# Patient Record
Sex: FEMALE | Race: BLACK OR AFRICAN AMERICAN
[De-identification: names, ages, dates, MRNs, and addresses within clinical notes are randomized per-mention and may not be internally consistent; named-entity substitution may affect disease eponyms.]

---

## 2018-04-29 ENCOUNTER — HOSPITAL ENCOUNTER (EMERGENCY)
Dept: HOSPITAL 76 - ED | Age: 23
Discharge: HOME | End: 2018-04-29
Payer: COMMERCIAL

## 2018-04-29 VITALS — DIASTOLIC BLOOD PRESSURE: 73 MMHG | SYSTOLIC BLOOD PRESSURE: 117 MMHG

## 2018-04-29 DIAGNOSIS — N30.00: Primary | ICD-10-CM

## 2018-04-29 LAB
CLARITY UR REFRACT.AUTO: CLEAR
GLUCOSE UR QL STRIP.AUTO: NEGATIVE MG/DL
HCG UR QL: NEGATIVE
KETONES UR QL STRIP.AUTO: NEGATIVE MG/DL
NITRITE UR QL STRIP.AUTO: NEGATIVE
PH UR STRIP.AUTO: 6.5 PH (ref 5–7.5)
PROT UR STRIP.AUTO-MCNC: NEGATIVE MG/DL
RBC # UR STRIP.AUTO: (no result) /UL
SP GR UR STRIP.AUTO: >=1.03 (ref 1–1.03)
SP GR UR STRIP.AUTO: >=1.03 (ref 1–1.03)
UROBILINOGEN UR QL STRIP.AUTO: (no result) E.U./DL
UROBILINOGEN UR STRIP.AUTO-MCNC: NEGATIVE MG/DL

## 2018-04-29 PROCEDURE — 81025 URINE PREGNANCY TEST: CPT

## 2018-04-29 PROCEDURE — 99283 EMERGENCY DEPT VISIT LOW MDM: CPT

## 2018-04-29 PROCEDURE — 81001 URINALYSIS AUTO W/SCOPE: CPT

## 2018-04-29 PROCEDURE — 81003 URINALYSIS AUTO W/O SCOPE: CPT

## 2018-04-29 PROCEDURE — 87086 URINE CULTURE/COLONY COUNT: CPT

## 2018-04-29 NOTE — ED PHYSICIAN DOCUMENTATION
History of Present Illness





- Stated complaint


Stated Complaint: FEMALE 





- Chief complaint


Chief Complaint: General





- History obtained from


History obtained from: Patient





- History of Present Illness


Timing: How many weeks ago (4)


Pain level max: 3


Pain level now: 1


Improved by: azo yesterday


Worsened by: urination





- Additonal information


Additional information: 





Patient is a 22-year-old female who presents to the emergency department with 

intermittent dysuria for the past 4 weeks.  Has become increasingly worse over 

the past 3 days.  Also having increased frequency and urgency.  No fevers.  

Does have mild low back pain.  No vomiting.  No possibility of pregnancy.  No 

changes in sexual partners.  Has not been sexually active for the past 6 weeks.





Review of Systems


Constitutional: denies: Fever, Chills


Nose: denies: Rhinorrhea / runny nose, Congestion


Throat: denies: Sore throat


Respiratory: denies: Cough


GI: denies: Abdominal Pain, Nausea, Vomiting, Diarrhea


: reports: Dysuria, Frequency, Hesitancy.  denies: Incontinent, Hematuria, 

Discharge


Skin: denies: Rash


Musculoskeletal: denies: Neck pain, Back pain


Neurologic: denies: Headache





PD PAST MEDICAL HISTORY





- Past Medical History


Past Medical History: Yes


: Other (UTI)





- Past Surgical History


Past Surgical History: No





- Present Medications


Home Medications: 


 Ambulatory Orders











 Medication  Instructions  Recorded  Confirmed


 


Nitrofurantoin Monohyd/M-Cryst 100 mg PO BID #10 capsule 04/29/18 





[Macrobid 100 mg Capsule]   














- Allergies


Allergies/Adverse Reactions: 


 Allergies











Allergy/AdvReac Type Severity Reaction Status Date / Time


 


No Known Drug Allergies Allergy   Verified 04/29/18 18:53














- Living Situation


Living Arrangement: reports: At home





- Social History


Does the pt smoke?: No


Does the pt have substance abuse?: No





- Family History


Family history: reports: Non contributory





PD ED PE NORMAL





- Vitals


Vital signs reviewed: Yes





- General


General: Alert and oriented X 3, No acute distress





- HEENT


HEENT: Moist mucous membranes





- Neck


Neck: Supple, no meningeal sign





- Cardiac


Cardiac: RRR





- Respiratory


Respiratory: No respiratory distress, Clear bilaterally





- Abdomen


Abdomen: Soft, Non tender, Non distended





- Female 


Female : Pt declined





- Back


Back: No CVA TTP





- Derm


Derm: Warm and dry, No rash





- Neuro


Neuro: Alert and oriented X 3





Results





- Vitals


Vitals: 


 Vital Signs - 24 hr











  04/29/18





  18:51


 


Temperature 37.4 C


 


Heart Rate 69


 


Respiratory 16





Rate 


 


Blood Pressure 117/73


 


O2 Saturation 100














- Labs


Labs: 


 Laboratory Tests











  04/29/18 04/29/18





  18:56 18:56


 


Urine Color  YELLOW 


 


Urine Clarity  CLEAR 


 


Urine pH  6.5 


 


Ur Specific Gravity  >=1.030 H  >=1.030 H


 


Urine Protein  NEGATIVE 


 


Urine Glucose (UA)  NEGATIVE 


 


Urine Ketones  NEGATIVE 


 


Urine Occult Blood  TRACE-INTA 


 


Urine Nitrite  NEGATIVE 


 


Urine Bilirubin  NEGATIVE 


 


Urine Urobilinogen  0.2 (NORMAL) 


 


Ur Leukocyte Esterase  NEGATIVE 


 


Ur Microscopic Review  NOT INDICATED 


 


Urine Culture Comments  NOT INDICATED 


 


Urine HCG, Qual   NEGATIVE














PD MEDICAL DECISION MAKING





- ED course


Complexity details: reviewed results, re-evaluated patient, considered 

differential, d/w patient


ED course: 





Patient is a 22-year-old female presents to the emergency department with a 

history suggestive of a UTI.  No STD risk factors.  Declines a pelvic 

examination.  Does understand that by treating for a presumed UTI we could miss 

alternative diagnoses.  Patient states that she will follow-up closely with her 

doctor if she fails to improve as expected.  Patient counseled regarding signs 

and symptoms for which I believe and urgent re-evaluation would be necessary. 

Patient with good understanding of and agreement to plan and is comfortable 

going home at this time





This document was made in part using voice recognition software. While efforts 

are made to proofread this document, sound alike and grammatical errors may 

occur.





Departure





- Departure


Disposition: 01 Home, Self Care


Clinical Impression: 


UTI (urinary tract infection)


Qualifiers:


 Urinary tract infection type: acute cystitis Hematuria presence: without 

hematuria Qualified Code(s): N30.00 - Acute cystitis without hematuria





Condition: Good


Instructions:  ED UTI Cystitis Female


Follow-Up: 


your,doctor in 4-5 days if not better [Other]


Prescriptions: 


Nitrofurantoin Monohyd/M-Cryst [Macrobid 100 mg Capsule] 100 mg PO BID #10 

capsule


Comments: 


Take all antibiotics until gone.  Return if you worsen. If you are not 

improving after the antibiotics, you should follow-up with your doctor for a 

pelvic exam for other etiologies of your symptoms.


Discharge Date/Time: 04/29/18 19:37

## 2018-05-07 ENCOUNTER — HOSPITAL ENCOUNTER (EMERGENCY)
Dept: HOSPITAL 76 - ED | Age: 23
Discharge: HOME | End: 2018-05-07
Payer: COMMERCIAL

## 2018-05-07 DIAGNOSIS — S39.012A: Primary | ICD-10-CM
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
## 2018-05-07 LAB
CLARITY UR REFRACT.AUTO: CLEAR
GLUCOSE UR QL STRIP.AUTO: NEGATIVE MG/DL
HCG UR QL: NEGATIVE
KETONES UR QL STRIP.AUTO: (no result) MG/DL
MUCOUS THREADS URNS QL MICRO: (no result)
NITRITE UR QL STRIP.AUTO: NEGATIVE
PH UR STRIP.AUTO: 6.5 PH (ref 5–7.5)
PROT UR STRIP.AUTO-MCNC: NEGATIVE MG/DL
RBC # UR STRIP.AUTO: (no result) /UL
RBC # URNS HPF: (no result) /HPF (ref 0–5)
SP GR UR STRIP.AUTO: 1.02 (ref 1–1.03)
SP GR UR STRIP.AUTO: 1.02 (ref 1–1.03)
SQUAMOUS URNS QL MICRO: (no result)
UROBILINOGEN UR QL STRIP.AUTO: (no result) E.U./DL
UROBILINOGEN UR STRIP.AUTO-MCNC: NEGATIVE MG/DL

## 2018-05-07 PROCEDURE — 99283 EMERGENCY DEPT VISIT LOW MDM: CPT

## 2018-05-07 PROCEDURE — 81025 URINE PREGNANCY TEST: CPT

## 2018-05-07 PROCEDURE — 87086 URINE CULTURE/COLONY COUNT: CPT

## 2018-05-07 PROCEDURE — 81001 URINALYSIS AUTO W/SCOPE: CPT

## 2018-05-07 PROCEDURE — 81003 URINALYSIS AUTO W/O SCOPE: CPT

## 2018-05-07 NOTE — ED PHYSICIAN DOCUMENTATION
PD HPI BACK PAIN





- Stated complaint


Stated Complaint: L SIDE PX





- Chief complaint


Chief Complaint: Abd Pain





- History obtained from


History obtained from: Patient





- History of Present Illness


Timing - onset: Today


Timing - details: Gradual onset, Still present


Location: Lower, Left


Quality: Pain, Spasm


Associated symptoms: No: Fever, Weakness


Worsened by: Movement, Lifting


Similar symptoms before: No diagnosis


Recently seen: Not recently seen





- Additional information


Additional information: 





Patient is a 22 year old female with no significant past medical history who is 

presenting to the emergency department for left sided pain.  patient states 

that she was running with a weighted vest when she developed some pain.  

patient reports that tonight when she tried to go to sleep she couldn't get 

comfortable so she came in for evaluation.  





Review of Systems


Ten Systems: 10 systems reviewed and negative


: denies: Dysuria, Frequency, Discharge, Vaginal bleeding


Musculoskeletal: reports: Back pain





PD PAST MEDICAL HISTORY





- Past Medical History


Past Medical History: Yes


: Other





- Past Surgical History


Past Surgical History: No





- Present Medications


Home Medications: 


 Ambulatory Orders











 Medication  Instructions  Recorded  Confirmed


 


Nitrofurantoin Monohyd/M-Cryst 100 mg PO BID #10 capsule 04/29/18 





[Macrobid 100 mg Capsule]   


 


Cyclobenzaprine [Flexeril] 10 mg PO TID PRN #10 tablet 05/07/18 


 


Lidocaine Patch 5% [Lidoderm Patch] 1 each TOP DAILY #10 patch 05/07/18 














- Allergies


Allergies/Adverse Reactions: 


 Allergies











Allergy/AdvReac Type Severity Reaction Status Date / Time


 


No Known Drug Allergies Allergy   Verified 04/29/18 18:53














- Social History


Does the pt smoke?: No


Smoking Status: Never smoker


Does the pt have substance abuse?: No





- POLST


Patient has POLST: No





PD ED PE NORMAL





- Vitals


Vital signs reviewed: Yes





- General


General: Alert and oriented X 3, No acute distress





- HEENT


HEENT: Atraumatic, PERRL





- Cardiac


Cardiac: RRR





- Respiratory


Respiratory: No respiratory distress





- Abdomen


Abdomen: Soft, Non tender, Non distended





- Derm


Derm: Normal color





- Extremities


Extremities: No deformity





- Neuro


Neuro: Alert and oriented X 3, No motor deficit


Eye Opening: Spontaneous


Motor: Obeys Commands


Verbal: Oriented


GCS Score: 15





- Psych


Psych: Normal mood





PD ED PE EXPANDED





- Back


Back: Soft tissue tenderness (mild tenderness to palaption of left lateral back)





Results





- Vitals


Vitals: 





 Vital Signs - 24 hr











  05/07/18 05/07/18





  21:42 23:08


 


Temperature 36.8 C 


 


Heart Rate 75 69


 


Respiratory 18 16





Rate  


 


Blood Pressure 100/61 109/80


 


O2 Saturation 100 99








 Oxygen











O2 Source                      Room air

















- Labs


Labs: 





 Laboratory Tests











  05/07/18 05/07/18





  22:15 22:15


 


Urine Color  YELLOW 


 


Urine Clarity  CLEAR 


 


Urine pH  6.5 


 


Ur Specific Gravity  1.025  1.025


 


Urine Protein  NEGATIVE 


 


Urine Glucose (UA)  NEGATIVE 


 


Urine Ketones  TRACE 


 


Urine Occult Blood  SMALL H 


 


Urine Nitrite  NEGATIVE 


 


Urine Bilirubin  NEGATIVE 


 


Urine Urobilinogen  0.2 (NORMAL) 


 


Ur Leukocyte Esterase  NEGATIVE 


 


Urine RBC  6-10 H 


 


Urine WBC  0-3 


 


Ur Squamous Epith Cells  MOD Squamous H 


 


Urine Bacteria  Few 


 


Urine Mucus  Few Strands 


 


Ur Microscopic Review  INDICATED 


 


Urine Culture Comments  NOT INDICATED 


 


Urine HCG, Qual   NEGATIVE














PD MEDICAL DECISION MAKING





- ED course


Complexity details: reviewed old records, reviewed results, re-evaluated patient

, considered differential, d/w patient


ED course: 





patient was seen and examined at bedside.  Urine was collected.  patient showed 

no sign of acute infection.  Patient was treated with ibuprofen and lidoderm 

patch.  Patient required no further work up and was stable for discharge with 

outpatient follow up.  





Departure





- Departure


Disposition: 01 Home, Self Care


Clinical Impression: 


 Low back strain





Condition: Good


Instructions:  ED Spasm Back No Trauma


Follow-Up: 


primary,care provider [Other] - Within 3 Days


Prescriptions: 


Cyclobenzaprine [Flexeril] 10 mg PO TID PRN #10 tablet


 PRN Reason: Spasms


Lidocaine Patch 5% [Lidoderm Patch] 1 each TOP DAILY #10 patch


Comments: 


Your diagnostics today were within normal limits.  Your symptoms are likely 

secondary to a muscle strain.  You should alternate between motrin and tylenol 

as needed for pain. You should also apply ice and heat as well as the lidoderm 

patch. You can take the flexeril for spasm but you cannot drive, work or 

operate heavy machinery while taking it.  You should follow up with your doctor 

if your symptoms persist.  You may return to the emergency department at any 

time for new, worsening or uncontrollable symptoms.  


Forms:  Activity restrictions

## 2018-05-08 VITALS — DIASTOLIC BLOOD PRESSURE: 68 MMHG | SYSTOLIC BLOOD PRESSURE: 110 MMHG

## 2018-06-02 ENCOUNTER — HOSPITAL ENCOUNTER (EMERGENCY)
Dept: HOSPITAL 76 - ED | Age: 23
Discharge: HOME | End: 2018-06-02
Payer: COMMERCIAL

## 2018-06-02 VITALS — SYSTOLIC BLOOD PRESSURE: 110 MMHG | DIASTOLIC BLOOD PRESSURE: 72 MMHG

## 2018-06-02 DIAGNOSIS — Z20.2: Primary | ICD-10-CM

## 2018-06-02 LAB
CLARITY UR REFRACT.AUTO: CLEAR
GLUCOSE UR QL STRIP.AUTO: NEGATIVE MG/DL
HCG UR QL: NEGATIVE
KETONES UR QL STRIP.AUTO: NEGATIVE MG/DL
NITRITE UR QL STRIP.AUTO: NEGATIVE
PH UR STRIP.AUTO: 7 PH (ref 5–7.5)
PROT UR STRIP.AUTO-MCNC: NEGATIVE MG/DL
RBC # UR STRIP.AUTO: NEGATIVE /UL
SP GR UR STRIP.AUTO: 1.01 (ref 1–1.03)
UROBILINOGEN UR QL STRIP.AUTO: (no result) E.U./DL
UROBILINOGEN UR STRIP.AUTO-MCNC: NEGATIVE MG/DL

## 2018-06-02 PROCEDURE — 81025 URINE PREGNANCY TEST: CPT

## 2018-06-02 PROCEDURE — 99283 EMERGENCY DEPT VISIT LOW MDM: CPT

## 2018-06-02 PROCEDURE — 86695 HERPES SIMPLEX TYPE 1 TEST: CPT

## 2018-06-02 PROCEDURE — 86696 HERPES SIMPLEX TYPE 2 TEST: CPT

## 2018-06-02 PROCEDURE — 87491 CHLMYD TRACH DNA AMP PROBE: CPT

## 2018-06-02 PROCEDURE — 87210 SMEAR WET MOUNT SALINE/INK: CPT

## 2018-06-02 PROCEDURE — 87591 N.GONORRHOEAE DNA AMP PROB: CPT

## 2018-06-02 PROCEDURE — 87086 URINE CULTURE/COLONY COUNT: CPT

## 2018-06-02 PROCEDURE — 81001 URINALYSIS AUTO W/SCOPE: CPT

## 2018-06-02 PROCEDURE — 87529 HSV DNA AMP PROBE: CPT

## 2018-06-02 PROCEDURE — 99282 EMERGENCY DEPT VISIT SF MDM: CPT

## 2018-06-02 PROCEDURE — 81599 UNLISTED MAAA: CPT

## 2018-06-02 PROCEDURE — 81003 URINALYSIS AUTO W/O SCOPE: CPT

## 2018-06-02 NOTE — ED PHYSICIAN DOCUMENTATION
History of Present Illness





- Stated complaint


Stated Complaint: FEMALE 





- Chief complaint


Chief Complaint: UTI





- Additonal information


Additional information: 





hx from pt


22 f


found out her SO has herpes


wants to be tested











Review of Systems


Constitutional: denies: Fever


: reports: Dysuria.  denies: Now pregnant EGA


Skin: denies: Lesions





PD PAST MEDICAL HISTORY





- Past Medical History


Past Medical History: No


: Other





- Past Surgical History


Past Surgical History: No





- Present Medications


Home Medications: 


 Ambulatory Orders











 Medication  Instructions  Recorded  Confirmed


 


No Known Home Medications [No  06/02/18 06/02/18





Known Home Medications]   














- Allergies


Allergies/Adverse Reactions: 


 Allergies











Allergy/AdvReac Type Severity Reaction Status Date / Time


 


No Known Drug Allergies Allergy   Verified 06/02/18 09:37














- Social History


Does the pt smoke?: No


Smoking Status: Never smoker


Does the pt drink ETOH?: No


Does the pt have substance abuse?: No





- Immunizations


Immunizations are current?: Yes





- POLST


Patient has POLST: No





PD ED PE NORMAL





- Vitals


Vital signs reviewed: Yes





- General


General: Alert and oriented X 3





- Cardiac


Cardiac: RRR





- Respiratory


Respiratory: No respiratory distress, Clear bilaterally





- Abdomen


Abdomen: Non tender





- Female 


Female : Chaperone present ([employer]), Other (no herpes lesions, no dc, no CMT, 

cultures taken for all)





Results





- Vitals


Vitals: 





 Vital Signs - 24 hr











  06/02/18





  09:33


 


Temperature 37.2 C


 


Heart Rate 79


 


Respiratory 18





Rate 


 


Blood Pressure 119/55 L


 


O2 Saturation 100








 Oxygen











O2 Source                      Room air

















- Labs


Labs: 





 Laboratory Tests











  06/02/18





  09:40


 


Urine Color  YELLOW


 


Urine Clarity  CLEAR


 


Urine pH  7.0


 


Ur Specific Gravity  1.015


 


Urine Protein  NEGATIVE


 


Urine Glucose (UA)  NEGATIVE


 


Urine Ketones  NEGATIVE


 


Urine Occult Blood  NEGATIVE


 


Urine Nitrite  NEGATIVE


 


Urine Bilirubin  NEGATIVE


 


Urine Urobilinogen  0.2 (NORMAL)


 


Ur Leukocyte Esterase  NEGATIVE


 


Ur Microscopic Review  NOT INDICATED


 


Urine Culture Comments  NOT INDICATED


 


Urine HCG, Qual  NEGATIVE














Departure





- Departure


Disposition: 01 Home, Self Care


Clinical Impression: 


 Exposure to STD





Condition: Good


Instructions:  STD Poss


Comments: 


Your exam was normal - no sign of herpes.


We took swabs to est for bacterial vaginosis, trichomonas, gonorrhea, chlamydia 

as well as herpes


The ER staff will call you if any of the results are positive.


You can also have [facility] call our lab for results.


Given the normal exam I would not recommend any treatment unless the tests come 

back positive


If you think you might need testing for HIV, that would best be done [place] 

[place]

## 2018-06-17 ENCOUNTER — HOSPITAL ENCOUNTER (EMERGENCY)
Dept: HOSPITAL 76 - ED | Age: 23
Discharge: HOME | End: 2018-06-17
Payer: COMMERCIAL

## 2018-06-17 VITALS — DIASTOLIC BLOOD PRESSURE: 67 MMHG | SYSTOLIC BLOOD PRESSURE: 119 MMHG

## 2018-06-17 DIAGNOSIS — B96.89: ICD-10-CM

## 2018-06-17 DIAGNOSIS — N76.0: Primary | ICD-10-CM

## 2018-06-17 LAB
CLARITY UR REFRACT.AUTO: CLEAR
GLUCOSE UR QL STRIP.AUTO: NEGATIVE MG/DL
HCG UR QL: NEGATIVE
KETONES UR QL STRIP.AUTO: NEGATIVE MG/DL
NITRITE UR QL STRIP.AUTO: NEGATIVE
PH UR STRIP.AUTO: 7 PH
PROT UR STRIP.AUTO-MCNC: NEGATIVE MG/DL
RBC # UR STRIP.AUTO: (no result) /UL
SP GR UR STRIP.AUTO: 1.02
UROBILINOGEN UR QL STRIP.AUTO: (no result) E.U./DL
UROBILINOGEN UR STRIP.AUTO-MCNC: NEGATIVE MG/DL

## 2018-06-17 PROCEDURE — 99283 EMERGENCY DEPT VISIT LOW MDM: CPT

## 2018-06-17 PROCEDURE — 87086 URINE CULTURE/COLONY COUNT: CPT

## 2018-06-17 PROCEDURE — 87220 TISSUE EXAM FOR FUNGI: CPT

## 2018-06-17 PROCEDURE — 81025 URINE PREGNANCY TEST: CPT

## 2018-06-17 PROCEDURE — 81003 URINALYSIS AUTO W/O SCOPE: CPT

## 2018-06-17 PROCEDURE — 87210 SMEAR WET MOUNT SALINE/INK: CPT

## 2018-06-17 PROCEDURE — 81001 URINALYSIS AUTO W/SCOPE: CPT

## 2018-06-17 PROCEDURE — 87591 N.GONORRHOEAE DNA AMP PROB: CPT

## 2018-06-17 PROCEDURE — 87491 CHLMYD TRACH DNA AMP PROBE: CPT

## 2018-06-17 NOTE — ED PHYSICIAN DOCUMENTATION
History of Present Illness





- Stated complaint


Stated Complaint: FEMALE 





- Chief complaint


Chief Complaint: UTI





- History obtained from


History obtained from: Patient





- History of Present Illness


Timing: How many weeks ago (1)


Pain level max: 3


Pain level now: 3


Improved by: nothing


Worsened by: nothing





- Additonal information


Additional information: 





Patient is a 22-year-old female who presents to the emergency department 

complaining of vaginal discharge, itching and discomfort.  Was recently treated 

with azithromycin for chlamydia.  Tested negative for gonorrhea.  She is 

concerned that she may have a yeast infection or bacterial vaginitis.  No 

fevers.  No abdominal pain.  No diarrhea or constipation.  No vomiting.  Denies 

any possibility of pregnancy.





Review of Systems


Constitutional: denies: Fever, Chills


Ears: denies: Ear pain


Nose: denies: Rhinorrhea / runny nose, Congestion


Respiratory: denies: Cough


GI: denies: Abdominal Pain, Nausea, Vomiting, Diarrhea


: reports: Discharge.  denies: Vaginal bleeding


Skin: denies: Rash


Musculoskeletal: denies: Neck pain, Back pain


Neurologic: denies: Focal weakness, Numbness, Headache





PD PAST MEDICAL HISTORY





- Past Medical History


Past Medical History: No


: Other





- Past Surgical History


Past Surgical History: No





- Present Medications


Home Medications: 


 Ambulatory Orders











 Medication  Instructions  Recorded  Confirmed


 


Metronidazole [Flagyl] 500 mg PO BID #14 tablet 06/17/18 














- Allergies


Allergies/Adverse Reactions: 


 Allergies











Allergy/AdvReac Type Severity Reaction Status Date / Time


 


No Known Drug Allergies Allergy   Verified 06/17/18 17:22














- Living Situation


Living Arrangement: reports: At home





- Social History


Does the pt smoke?: No


Smoking Status: Never smoker


Does the pt drink ETOH?: No


Does the pt have substance abuse?: No





- Family History


Family history: reports: Non contributory





- Immunizations


Immunizations are current?: Yes





- POLST


Patient has POLST: No





PD ED PE NORMAL





- Vitals


Vital signs reviewed: Yes





- General


General: Alert and oriented X 3





- HEENT


HEENT: Moist mucous membranes





- Neck


Neck: Supple, no meningeal sign





- Cardiac


Cardiac: RRR





- Respiratory


Respiratory: No respiratory distress, Clear bilaterally





- Abdomen


Abdomen: Soft, Non tender, Non distended





- Female 


Female : Chaperone present ([facility]), Other (diffuse vaginal 

irritation with thin white discharge. no CMT, no adnexal tenderness.)





- Back


Back: No CVA TTP, No spinal TTP





- Derm


Derm: Warm and dry





- Neuro


Neuro: Alert and oriented X 3





- Psych


Psych: Normal mood, Normal affect





Results





- Vitals


Vitals: 


 Vital Signs - 24 hr











  06/17/18





  16:31


 


Temperature 37.4 C


 


Heart Rate 75


 


Respiratory 16





Rate 


 


Blood Pressure 119/67


 


O2 Saturation 100








 Oxygen











O2 Source                      Room air

















- Labs


Labs: 


 Microbiology











 06/17/18 17:09 KOH Preparation - Final





 Other - Vaginal 


 


 06/17/18 17:09 Wet Prep - Final





 Vaginal 








 Laboratory Tests











  06/17/18





  16:45


 


Urine Color  YELLOW


 


Urine Clarity  CLEAR


 


Urine pH  7.0


 


Ur Specific Gravity  1.020


 


Urine Protein  NEGATIVE


 


Urine Glucose (UA)  NEGATIVE


 


Urine Ketones  NEGATIVE


 


Urine Occult Blood  TRACE-INTA


 


Urine Nitrite  NEGATIVE


 


Urine Bilirubin  NEGATIVE


 


Urine Urobilinogen  0.2 (NORMAL)


 


Ur Leukocyte Esterase  NEGATIVE


 


Ur Microscopic Review  NOT INDICATED


 


Urine Culture Comments  NOT INDICATED


 


Urine HCG, Qual  NEGATIVE














PD MEDICAL DECISION MAKING





- ED course


Complexity details: reviewed results, re-evaluated patient, considered 

differential, d/w patient


ED course: 





Patient is a 22-year-old female who presents to the emergency department with 

vaginal discharge and pain.  Recently treated for chlamydia.  Will resend her 

Chlamydia testing to ensure the infection is cleared.  Appears to have 

bacterial vaginitis and will place on Flagyl for this.  Patient counseled 

regarding signs and symptoms for which I believe and urgent re-evaluation would 

be necessary. Patient with good understanding of and agreement to plan and is 

comfortable going home at this time





This document was made in part using voice recognition software. While efforts 

are made to proofread this document, sound alike and grammatical errors may 

occur.





- Sepsis Event


Vital Signs: 


 Vital Signs - 24 hr











  06/17/18





  16:31


 


Temperature 37.4 C


 


Heart Rate 75


 


Respiratory 16





Rate 


 


Blood Pressure 119/67


 


O2 Saturation 100








 Oxygen











O2 Source                      Room air

















Departure





- Departure


Disposition: 01 Home, Self Care


Clinical Impression: 


 Bacterial vaginitis





Condition: Good


Instructions:  ED Vaginosis Bacterial


Follow-Up: 


your,doctor in 1 week if not better [Other]


Prescriptions: 


Metronidazole [Flagyl] 500 mg PO BID #14 tablet


Comments: 


Take the Flagyl until gone.  Return if you worsen.  Do not drink alcohol with 

this visit will make you violently ill.  Return if you worsen


Discharge Date/Time: 06/17/18 17:42

## 2018-07-11 ENCOUNTER — HOSPITAL ENCOUNTER (EMERGENCY)
Dept: HOSPITAL 76 - ED | Age: 23
Discharge: HOME | End: 2018-07-11
Payer: COMMERCIAL

## 2018-07-11 VITALS — SYSTOLIC BLOOD PRESSURE: 103 MMHG | DIASTOLIC BLOOD PRESSURE: 47 MMHG

## 2018-07-11 DIAGNOSIS — N76.0: Primary | ICD-10-CM

## 2018-07-11 LAB
CLARITY UR REFRACT.AUTO: CLEAR
GLUCOSE UR QL STRIP.AUTO: NEGATIVE MG/DL
HCG UR QL: NEGATIVE
KETONES UR QL STRIP.AUTO: NEGATIVE MG/DL
NITRITE UR QL STRIP.AUTO: NEGATIVE
PH UR STRIP.AUTO: 6 PH (ref 5–7.5)
PROT UR STRIP.AUTO-MCNC: NEGATIVE MG/DL
RBC # UR STRIP.AUTO: NEGATIVE /UL
SP GR UR STRIP.AUTO: 1.02 (ref 1–1.03)
SP GR UR STRIP.AUTO: 1.02 (ref 1–1.03)
UROBILINOGEN UR QL STRIP.AUTO: (no result) E.U./DL
UROBILINOGEN UR STRIP.AUTO-MCNC: NEGATIVE MG/DL

## 2018-07-11 PROCEDURE — 81001 URINALYSIS AUTO W/SCOPE: CPT

## 2018-07-11 PROCEDURE — 81025 URINE PREGNANCY TEST: CPT

## 2018-07-11 PROCEDURE — 81003 URINALYSIS AUTO W/O SCOPE: CPT

## 2018-07-11 PROCEDURE — 99283 EMERGENCY DEPT VISIT LOW MDM: CPT

## 2018-07-11 PROCEDURE — 87086 URINE CULTURE/COLONY COUNT: CPT

## 2018-07-11 NOTE — ED PHYSICIAN DOCUMENTATION
PD HPI FEMALE 





- Stated complaint


Stated Complaint: FEMALE 





- Chief complaint


Chief Complaint: General





- History obtained from


History obtained from: Patient





- History of Present Illness


Timing - onset: How many days ago (3)


Timing - duration: Days (3)


Timing - details: Gradual onset, Still present


Associated symptoms: Vaginal discharge


Contributing factors: Birth control, Other (has had BV previously).  No: 

Exposed to STD


Similar symptoms before: Diagnosis (BV)


Recently seen: Not recently seen





- Additional information


Additional information: 





22-year-old female has had a prior history of chlamydia which was treated with 

doxycycline and following that she developed bacterial vaginosis of foul-

smelling discharge and she was treated then with metronidazole with rapid 

resolution of her symptoms.  She has had a recent menstrual period and 

following this she has had return of this foul-smelling yellowish discharge.  

She denies any exposure to STD and states the symptoms and discharge are 

similar to previous for bacterial vaginosis.





Review of Systems


Constitutional: denies: Fever


Eyes: denies: Decreased vision


Ears: denies: Ear pain


Nose: denies: Congestion


Throat: denies: Sore throat


Cardiac: denies: Chest pain / pressure, Palpitations


Respiratory: denies: Dyspnea, Cough


GI: denies: Abdominal Pain, Nausea, Vomiting


: reports: Discharge.  denies: Dysuria, Frequency


Skin: denies: Rash


Musculoskeletal: denies: Neck pain, Back pain, Extremity pain





PD PAST MEDICAL HISTORY





- Past Medical History


: Other





- Past Surgical History


Past Surgical History: No





- Present Medications


Home Medications: 


 Ambulatory Orders











 Medication  Instructions  Recorded  Confirmed


 


Metronidazole [Flagyl] 500 mg PO BID #14 tablet 06/17/18 


 


Metronidazole 500 mg PO BID #14 tablet 07/11/18 














- Allergies


Allergies/Adverse Reactions: 


 Allergies











Allergy/AdvReac Type Severity Reaction Status Date / Time


 


No Known Drug Allergies Allergy   Verified 06/17/18 17:22














- Social History


Does the pt smoke?: No


Smoking Status: Never smoker


Does the pt drink ETOH?: No


Does the pt have substance abuse?: No





- Immunizations


Immunizations are current?: Yes





- POLST


Patient has POLST: No





PD ED PE NORMAL





- Vitals


Vital signs reviewed: Yes (normal )





- General


General: Alert and oriented X 3, No acute distress, Well developed/nourished





- HEENT


HEENT: Atraumatic, PERRL, EOMI





- Respiratory


Respiratory: No respiratory distress





- Derm


Derm: Normal color, Warm and dry, No rash





- Extremities


Extremities: No deformity, No edema





- Neuro


Neuro: Alert and oriented X 3, CNs 2-12 intact, No motor deficit, No sensory 

deficit, Normal speech


Eye Opening: Spontaneous


Motor: Obeys Commands


Verbal: Oriented


GCS Score: 15





- Psych


Psych: Normal mood, Normal affect





Results





- Vitals


Vitals: 


 Vital Signs - 24 hr











  07/11/18





  12:59


 


Temperature 36.7 C


 


Heart Rate 60


 


Respiratory 16





Rate 


 


Blood Pressure 103/47 L


 


O2 Saturation 99








 Oxygen











O2 Source                      Room air

















- Labs


Labs: 


 Laboratory Tests











  07/11/18 07/11/18





  13:11 13:11


 


Urine Color   YELLOW


 


Urine Clarity   CLEAR


 


Urine pH   6.0


 


Ur Specific Gravity  1.025  1.025


 


Urine Protein   NEGATIVE


 


Urine Glucose (UA)   NEGATIVE


 


Urine Ketones   NEGATIVE


 


Urine Occult Blood   NEGATIVE


 


Urine Nitrite   NEGATIVE


 


Urine Bilirubin   NEGATIVE


 


Urine Urobilinogen   0.2 (NORMAL)


 


Ur Leukocyte Esterase   NEGATIVE


 


Ur Microscopic Review   NOT INDICATED


 


Urine Culture Comments   NOT INDICATED


 


Urine HCG, Qual  NEGATIVE 














PD MEDICAL DECISION MAKING





- ED course


Complexity details: reviewed old records, reviewed results, re-evaluated patient

, considered differential, d/w patient


ED course: 





22-year-old female with a prior history of bacterial vaginosis that rapidly 

improved with use of metronidazole has recurrence of her symptoms.  She has a 

foul-smelling yellowish discharge.  She has opted for treatment without 

specimen today.  I have encouraged her to follow-up with GYN should she have 

recurrence of her symptoms again.





- Sepsis Event


Vital Signs: 


 Vital Signs - 24 hr











  07/11/18





  12:59


 


Temperature 36.7 C


 


Heart Rate 60


 


Respiratory 16





Rate 


 


Blood Pressure 103/47 L


 


O2 Saturation 99








 Oxygen











O2 Source                      Room air

















Departure





- Departure


Disposition: 01 Home, Self Care


Clinical Impression: 


 Bacterial vaginitis





Instructions:  ED Vaginosis Bacterial


Follow-Up: 


VICENTE BOSS [Primary Care Provider] - 


Prescriptions: 


Metronidazole 500 mg PO BID #14 tablet


Forms:  Activity restrictions

## 2018-07-12 ENCOUNTER — HOSPITAL ENCOUNTER (EMERGENCY)
Dept: HOSPITAL 76 - ED | Age: 23
Discharge: HOME | End: 2018-07-12
Payer: COMMERCIAL

## 2018-07-12 VITALS — SYSTOLIC BLOOD PRESSURE: 138 MMHG | DIASTOLIC BLOOD PRESSURE: 88 MMHG

## 2018-07-12 DIAGNOSIS — R03.0: ICD-10-CM

## 2018-07-12 DIAGNOSIS — N76.0: Primary | ICD-10-CM

## 2018-07-12 LAB
ALBUMIN DIAFP-MCNC: 4.1 G/DL (ref 3.2–5.5)
ALBUMIN/GLOB SERPL: 1.2 {RATIO} (ref 1–2.2)
ALP SERPL-CCNC: 81 IU/L (ref 42–121)
ALT SERPL W P-5'-P-CCNC: 17 IU/L (ref 10–60)
ANION GAP SERPL CALCULATED.4IONS-SCNC: 7 MMOL/L (ref 6–13)
AST SERPL W P-5'-P-CCNC: 24 IU/L (ref 10–42)
BASOPHILS NFR BLD AUTO: 0 10^3/UL (ref 0–0.1)
BASOPHILS NFR BLD AUTO: 0.7 %
BILIRUB BLD-MCNC: 0.5 MG/DL (ref 0.2–1)
BUN SERPL-MCNC: 16 MG/DL (ref 6–20)
CALCIUM UR-MCNC: 9.2 MG/DL (ref 8.5–10.3)
CHLORIDE SERPL-SCNC: 105 MMOL/L (ref 101–111)
CLARITY UR REFRACT.AUTO: CLEAR
CO2 SERPL-SCNC: 27 MMOL/L (ref 21–32)
CREAT SERPLBLD-SCNC: 0.9 MG/DL (ref 0.4–1)
EOSINOPHIL # BLD AUTO: 0 10^3/UL (ref 0–0.7)
EOSINOPHIL NFR BLD AUTO: 0.4 %
ERYTHROCYTE [DISTWIDTH] IN BLOOD BY AUTOMATED COUNT: 13.1 % (ref 12–15)
GFRSERPLBLD MDRD-ARVRAT: 97 ML/MIN/{1.73_M2} (ref 89–?)
GLOBULIN SER-MCNC: 3.5 G/DL (ref 2.1–4.2)
GLUCOSE SERPL-MCNC: 94 MG/DL (ref 70–100)
GLUCOSE UR QL STRIP.AUTO: NEGATIVE MG/DL
HCG UR QL: NEGATIVE
HGB UR QL STRIP: 12.7 G/DL (ref 12–16)
KETONES UR QL STRIP.AUTO: (no result) MG/DL
LIPASE SERPL-CCNC: 30 U/L (ref 22–51)
LYMPHOCYTES # SPEC AUTO: 1.5 10^3/UL (ref 1.5–3.5)
LYMPHOCYTES NFR BLD AUTO: 30.6 %
MCH RBC QN AUTO: 29.1 PG (ref 27–31)
MCHC RBC AUTO-ENTMCNC: 32.8 G/DL (ref 32–36)
MCV RBC AUTO: 88.8 FL (ref 81–99)
MONOCYTES # BLD AUTO: 0.4 10^3/UL (ref 0–1)
MONOCYTES NFR BLD AUTO: 9 %
NEUTROPHILS # BLD AUTO: 2.9 10^3/UL (ref 1.5–6.6)
NEUTROPHILS # SNV AUTO: 4.9 X10^3/UL (ref 4.8–10.8)
NEUTROPHILS NFR BLD AUTO: 59.3 %
NITRITE UR QL STRIP.AUTO: NEGATIVE
PDW BLD AUTO: 8.2 FL (ref 7.9–10.8)
PH UR STRIP.AUTO: 6 PH (ref 5–7.5)
PLAT MORPH BLD: (no result)
PLATELET # BLD: 245 10^3/UL (ref 130–450)
PLATELET BLD QL SMEAR: (no result)
PROT SPEC-MCNC: 7.6 G/DL (ref 6.7–8.2)
PROT UR STRIP.AUTO-MCNC: (no result) MG/DL
RBC # UR STRIP.AUTO: NEGATIVE /UL
RBC MAR: 4.37 10^6/UL (ref 4.2–5.4)
SODIUM SERPLBLD-SCNC: 139 MMOL/L (ref 135–145)
SP GR UR STRIP.AUTO: 1.02 (ref 1–1.03)
UROBILINOGEN UR QL STRIP.AUTO: (no result) E.U./DL
UROBILINOGEN UR STRIP.AUTO-MCNC: NEGATIVE MG/DL

## 2018-07-12 PROCEDURE — 85025 COMPLETE CBC W/AUTO DIFF WBC: CPT

## 2018-07-12 PROCEDURE — 87210 SMEAR WET MOUNT SALINE/INK: CPT

## 2018-07-12 PROCEDURE — 99283 EMERGENCY DEPT VISIT LOW MDM: CPT

## 2018-07-12 PROCEDURE — 81001 URINALYSIS AUTO W/SCOPE: CPT

## 2018-07-12 PROCEDURE — 87086 URINE CULTURE/COLONY COUNT: CPT

## 2018-07-12 PROCEDURE — 36415 COLL VENOUS BLD VENIPUNCTURE: CPT

## 2018-07-12 PROCEDURE — 80053 COMPREHEN METABOLIC PANEL: CPT

## 2018-07-12 PROCEDURE — 87491 CHLMYD TRACH DNA AMP PROBE: CPT

## 2018-07-12 PROCEDURE — 87591 N.GONORRHOEAE DNA AMP PROB: CPT

## 2018-07-12 PROCEDURE — 81025 URINE PREGNANCY TEST: CPT

## 2018-07-12 PROCEDURE — 81003 URINALYSIS AUTO W/O SCOPE: CPT

## 2018-07-12 PROCEDURE — 83690 ASSAY OF LIPASE: CPT

## 2018-07-12 NOTE — ED PHYSICIAN DOCUMENTATION
History of Present Illness





- Stated complaint


Stated Complaint: FEM 





- Chief complaint


Chief Complaint: General





- History obtained from


History obtained from: Patient





- History of Present Illness


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 

duty in the [employer] with recurrent bacterial vaginosis, she also had chlamydia 

early last much month which was treated.  She has been having more discharge 

and was seen yesterday and diagnosed likely with likely BV, she refused a 

pelvic exam at the time per her.  Now with increased discharge today and would 

like to pursue retesting for BV versus STDs.  There was a little bit of blood 

in the discharge and she also had a panic attack but denies drinking alcohol.)





Review of Systems


Constitutional: denies: Fever, Chills


Nose: denies: Rhinorrhea / runny nose, Congestion


Cardiac: denies: Chest pain / pressure, Palpitations


Respiratory: denies: Dyspnea, Cough


GI: denies: Abdominal Pain





PD PAST MEDICAL HISTORY





- Past Medical History


: Other





- Past Surgical History


Past Surgical History: No





- Present Medications


Home Medications: 


 Ambulatory Orders











 Medication  Instructions  Recorded  Confirmed


 


Metronidazole [Flagyl] 500 mg PO BID #14 tablet 06/17/18 


 


Metronidazole 500 mg PO BID #14 tablet 07/11/18 


 


Clindamycin Phosphate [Clindesse] 5 gm VG DAILY #7 crm.er..g. 07/12/18 














- Allergies


Allergies/Adverse Reactions: 


 Allergies











Allergy/AdvReac Type Severity Reaction Status Date / Time


 


No Known Drug Allergies Allergy   Verified 07/12/18 14:37














- Social History


Does the pt smoke?: No


Smoking Status: Never smoker


Does the pt drink ETOH?: No


Does the pt have substance abuse?: No





- Immunizations


Immunizations are current?: Yes





- POLST


Patient has POLST: No





PD ED PE NORMAL





- Vitals


Vital signs reviewed: Yes





- General


General: Alert and oriented X 3, No acute distress





- Abdomen


Abdomen: Normal bowel sounds, Soft, Non tender





- Female 


Female : Chaperone present (HEATHER Landrum RN), Other (A small amount of yellowish 

discharge with a slight blood-tinged, no bimanual tenderness.)





- Neuro


Neuro: Alert and oriented X 3, Normal speech





Results





- Vitals


Vitals: 


 Vital Signs - 24 hr











  07/12/18





  14:33


 


Temperature 36.9 C


 


Heart Rate 53 L


 


Respiratory 16





Rate 


 


Blood Pressure 143/105 H


 


O2 Saturation 98








 Oxygen











O2 Source                      Room air

















- Labs


Labs: 


 Microbiology











 07/12/18 15:40 Wet Prep - Final





 Genital - Cervix 








 Laboratory Tests











  07/12/18 07/12/18 07/12/18





  15:10 15:10 15:23


 


WBC  4.9  


 


RBC  4.37  


 


Hgb  12.7  


 


Hct  38.8  


 


MCV  88.8  


 


MCH  29.1  


 


MCHC  32.8  


 


RDW  13.1  


 


Sodium   139 


 


Potassium   3.7 


 


Chloride   105 


 


Carbon Dioxide   27 


 


Anion Gap   7.0 


 


BUN   16 


 


Creatinine   0.9 


 


Estimated GFR (MDRD)   97 


 


Glucose   94 


 


Calcium   9.2 


 


Total Bilirubin   0.5 


 


AST   24 


 


ALT   17 


 


Alkaline Phosphatase   81 


 


Total Protein   7.6 


 


Albumin   4.1 


 


Globulin   3.5 


 


Albumin/Globulin Ratio   1.2 


 


Lipase   30 


 


Urine Color    YELLOW


 


Urine Clarity    CLEAR


 


Urine pH    6.0


 


Ur Specific Gravity    1.025


 


Urine Protein    TRACE


 


Urine Glucose (UA)    NEGATIVE


 


Urine Ketones    TRACE


 


Urine Occult Blood    NEGATIVE


 


Urine Nitrite    NEGATIVE


 


Urine Bilirubin    NEGATIVE


 


Urine Urobilinogen    0.2 (NORMAL)


 


Ur Leukocyte Esterase    NEGATIVE


 


Ur Microscopic Review    NOT INDICATED


 


Urine Culture Comments    NOT INDICATED


 


Urine HCG, Qual    NEGATIVE














PD MEDICAL DECISION MAKING





- ED course


ED course: 





She does have clue cells, but less than 20%, but that may be because she 

already took a dose of Flagyl.  Not sure if the panic attack and other symptoms 

she had this morning were from the Flagyl, seems unlikely since she has had 

taken Flagyl before without ill effect.





We discussed this, she plans to continue trying Flagyl but if has more side 

effects and ill effects from meds, I am giving her vaginal clindamycin to 

substitute.





- Sepsis Event


Vital Signs: 


 Vital Signs - 24 hr











  07/12/18





  14:33


 


Temperature 36.9 C


 


Heart Rate 53 L


 


Respiratory 16





Rate 


 


Blood Pressure 143/105 H


 


O2 Saturation 98








 Oxygen











O2 Source                      Room air

















Departure





- Departure


Disposition: 01 Home, Self Care


Clinical Impression: 


 Bacterial vaginitis





Condition: Good


Record reviewed to determine appropriate education?: Yes


Instructions:  ED Vaginosis Bacterial


Prescriptions: 


Clindamycin Phosphate [Clindesse] 5 gm VG DAILY #7 crm.er..g.


Comments: 


Continue the metronidazole, if you have further ill effects from it though you 

can switch to the clindamycin vaginal cream.  Return if worsening.  Follow-up 

with your doctor in about a week.





Your blood pressure was elevated today on check into the emergency department.  

This does not mean that you have hypertension, it is a common phenomenon to 

come to the emergency department and have elevated blood pressure.  I recommend 

that you see your primary care physician within the week to have it rechecked 

when you are feeling better.

## 2018-07-21 ENCOUNTER — HOSPITAL ENCOUNTER (EMERGENCY)
Dept: HOSPITAL 76 - ED | Age: 23
Discharge: ANOTHER HOSPITAL | End: 2018-07-21
Payer: COMMERCIAL

## 2018-07-21 VITALS — DIASTOLIC BLOOD PRESSURE: 76 MMHG | SYSTOLIC BLOOD PRESSURE: 122 MMHG

## 2018-07-21 DIAGNOSIS — G83.9: ICD-10-CM

## 2018-07-21 DIAGNOSIS — R53.1: Primary | ICD-10-CM

## 2018-07-21 DIAGNOSIS — R44.8: ICD-10-CM

## 2018-07-21 LAB
ALBUMIN DIAFP-MCNC: 4.5 G/DL (ref 3.2–5.5)
ALBUMIN/GLOB SERPL: 1.4 {RATIO} (ref 1–2.2)
ALP SERPL-CCNC: 78 IU/L (ref 42–121)
ALT SERPL W P-5'-P-CCNC: 18 IU/L (ref 10–60)
ANION GAP SERPL CALCULATED.4IONS-SCNC: 7 MMOL/L (ref 6–13)
AST SERPL W P-5'-P-CCNC: 26 IU/L (ref 10–42)
BASOPHILS NFR BLD AUTO: 0 10^3/UL (ref 0–0.1)
BASOPHILS NFR BLD AUTO: 0.8 %
BILIRUB BLD-MCNC: 0.6 MG/DL (ref 0.2–1)
BUN SERPL-MCNC: 11 MG/DL (ref 6–20)
CALCIUM UR-MCNC: 9.3 MG/DL (ref 8.5–10.3)
CHLORIDE SERPL-SCNC: 104 MMOL/L (ref 101–111)
CLARITY UR REFRACT.AUTO: CLEAR
CO2 SERPL-SCNC: 25 MMOL/L (ref 21–32)
CREAT SERPLBLD-SCNC: 1 MG/DL (ref 0.4–1)
EOSINOPHIL # BLD AUTO: 0 10^3/UL (ref 0–0.7)
EOSINOPHIL NFR BLD AUTO: 0.6 %
ERYTHROCYTE [DISTWIDTH] IN BLOOD BY AUTOMATED COUNT: 12.8 % (ref 12–15)
GFRSERPLBLD MDRD-ARVRAT: 86 ML/MIN/{1.73_M2} (ref 89–?)
GLOBULIN SER-MCNC: 3.2 G/DL (ref 2.1–4.2)
GLUCOSE SERPL-MCNC: 88 MG/DL (ref 70–100)
GLUCOSE UR QL STRIP.AUTO: NEGATIVE MG/DL
HCG UR QL: NEGATIVE
HGB UR QL STRIP: 12.7 G/DL (ref 12–16)
KETONES UR QL STRIP.AUTO: NEGATIVE MG/DL
LIPASE SERPL-CCNC: 25 U/L (ref 22–51)
LYMPHOCYTES # SPEC AUTO: 2.1 10^3/UL (ref 1.5–3.5)
LYMPHOCYTES NFR BLD AUTO: 48.1 %
MCH RBC QN AUTO: 29.1 PG (ref 27–31)
MCHC RBC AUTO-ENTMCNC: 32.2 G/DL (ref 32–36)
MCV RBC AUTO: 90.2 FL (ref 81–99)
MONOCYTES # BLD AUTO: 0.5 10^3/UL (ref 0–1)
MONOCYTES NFR BLD AUTO: 10.6 %
NEUTROPHILS # BLD AUTO: 1.8 10^3/UL (ref 1.5–6.6)
NEUTROPHILS # SNV AUTO: 4.4 X10^3/UL (ref 4.8–10.8)
NEUTROPHILS NFR BLD AUTO: 39.9 %
NITRITE UR QL STRIP.AUTO: NEGATIVE
PDW BLD AUTO: 8.3 FL (ref 7.9–10.8)
PH UR STRIP.AUTO: 6 PH (ref 5–7.5)
PLATELET # BLD: 226 10^3/UL (ref 130–450)
PROT SPEC-MCNC: 7.7 G/DL (ref 6.7–8.2)
PROT UR STRIP.AUTO-MCNC: NEGATIVE MG/DL
RBC # UR STRIP.AUTO: (no result) /UL
RBC MAR: 4.36 10^6/UL (ref 4.2–5.4)
SODIUM SERPLBLD-SCNC: 136 MMOL/L (ref 135–145)
SP GR UR STRIP.AUTO: 1.01 (ref 1–1.03)
UROBILINOGEN UR QL STRIP.AUTO: (no result) E.U./DL
UROBILINOGEN UR STRIP.AUTO-MCNC: NEGATIVE MG/DL

## 2018-07-21 PROCEDURE — 99284 EMERGENCY DEPT VISIT MOD MDM: CPT

## 2018-07-21 PROCEDURE — 83690 ASSAY OF LIPASE: CPT

## 2018-07-21 PROCEDURE — 81025 URINE PREGNANCY TEST: CPT

## 2018-07-21 PROCEDURE — 81003 URINALYSIS AUTO W/O SCOPE: CPT

## 2018-07-21 PROCEDURE — 85025 COMPLETE CBC W/AUTO DIFF WBC: CPT

## 2018-07-21 PROCEDURE — 36415 COLL VENOUS BLD VENIPUNCTURE: CPT

## 2018-07-21 PROCEDURE — 70450 CT HEAD/BRAIN W/O DYE: CPT

## 2018-07-21 PROCEDURE — 81001 URINALYSIS AUTO W/SCOPE: CPT

## 2018-07-21 PROCEDURE — 87086 URINE CULTURE/COLONY COUNT: CPT

## 2018-07-21 PROCEDURE — 80053 COMPREHEN METABOLIC PANEL: CPT

## 2018-07-21 RX ADMIN — POTASSIUM BICARBONATE STA MEQ: 25 TABLET, EFFERVESCENT ORAL at 21:06

## 2018-07-21 NOTE — ED PHYSICIAN DOCUMENTATION
PD HPI FOCAL NEURO





- Stated complaint


Stated Complaint: RG LEG PRESSURE/UNABLE TO MOVE





- Chief complaint


Chief Complaint: Abd Pain





- History obtained from


History obtained from: Patient





- History of Present Illness


Timing - onset: Today (This is a previously healthy 22-year-old woman who today 

around 1:00 developed some very odd neurologic symptoms.  It was preceded by 

back pain which is now gone.  Now she has right leg numbness and is unable to 

move it because of muscular weakness at all.  She cannot walk on it.  She also 

notes milder symptoms in the right arm.  She said she had similar symptoms 

before that were potentially chocked up as a panic attack, but she is not 

panicky now in the symptoms certainly are all lateralizing to the right.)





- Additional information


Additional information: 





She denies any recent travel or mosquito bites.





Review of Systems


Ten Systems: 10 systems reviewed and negative


Constitutional: denies: Fever, Chills


GI: denies: Abdominal Pain, Nausea, Vomiting


Neurologic: reports: Focal weakness, Numbness, Headache (She has been having on 

and off headaches for the last few days but none now.).  denies: Generalized 

weakness, Syncope





PD PAST MEDICAL HISTORY





- Past Medical History


: Other





- Past Surgical History


Past Surgical History: No





- Present Medications


Home Medications: 


 Ambulatory Orders











 Medication  Instructions  Recorded  Confirmed


 


Metronidazole [Flagyl] 500 mg PO BID #14 tablet 06/17/18 


 


Metronidazole 500 mg PO BID #14 tablet 07/11/18 


 


Clindamycin Phosphate [Clindesse] 5 gm VG DAILY #7 crm.er..g. 07/12/18 














- Allergies


Allergies/Adverse Reactions: 


 Allergies











Allergy/AdvReac Type Severity Reaction Status Date / Time


 


No Known Drug Allergies Allergy   Verified 07/12/18 14:37














- Social History


Does the pt smoke?: No


Smoking Status: Never smoker


Does the pt drink ETOH?: No


Does the pt have substance abuse?: No





- Immunizations


Immunizations are current?: Yes





- POLST


Patient has POLST: No





PD ED PE NORMAL





- Vitals


Vital signs reviewed: Yes





- General


General: Alert and oriented X 3, No acute distress





- HEENT


HEENT: PERRL, EOMI





- Neck


Neck: Supple, no meningeal sign, No bony TTP





- Cardiac


Cardiac: RRR, No murmur





- Respiratory


Respiratory: No respiratory distress, Clear bilaterally





- Abdomen


Abdomen: Normal bowel sounds, Soft, Non tender





- Neuro


Neuro: Alert and oriented X 3, Other (She has normal strength in the upper 

extremities,Which is most significant in the right thigh.  She is unable to 

lift the thigh off the bed at all but does make visible effort to do so.  She 

has symmetric and brisk reflexes throughout the lower extremities is going on 

there but testing the sensation and reflexes they are symmetric throughout the 

upper extremities.  She does have diminished sensation on the right side from 

about T12 down. She is unable to lift her right leg off the bed at all due to 

weakness but does make visible effort to do so.  The sensation in the right 

side is most market in the right thigh which is basically absent.  She has 

brisk reflexes throughout the lower extremities.)


Eye Opening: Spontaneous


Motor: Obeys Commands


Verbal: Oriented


GCS Score: 15





- Psych


Psych: Normal mood, Normal affect





Results





- Vitals


Vitals: 


 Vital Signs - 24 hr











  07/21/18 07/21/18 07/21/18





  19:23 20:27 21:03


 


Temperature 37.1 C  


 


Heart Rate 77 76 75


 


Respiratory 20 18 16





Rate   


 


Blood Pressure 111/42 L 100/50 L 118/68


 


O2 Saturation 100 100 100








 Oxygen











O2 Source                      Room air

















- Labs


Labs: 


 Laboratory Tests











  07/21/18 07/21/18 07/21/18





  19:52 19:52 20:35


 


WBC  4.4 L  


 


RBC  4.36  


 


Hgb  12.7  


 


Hct  39.3  


 


MCV  90.2  


 


MCH  29.1  


 


MCHC  32.2  


 


RDW  12.8  


 


Plt Count  226  


 


MPV  8.3  


 


Neut # (Auto)  1.8  


 


Lymph # (Auto)  2.1  


 


Mono # (Auto)  0.5  


 


Eos # (Auto)  0.0  


 


Baso # (Auto)  0.0  


 


Absolute Nucleated RBC  0.00  


 


Nucleated RBC %  0.0  


 


Sodium   136 


 


Potassium   3.1 L 


 


Chloride   104 


 


Carbon Dioxide   25 


 


Anion Gap   7.0 


 


BUN   11 


 


Creatinine   1.0 


 


Estimated GFR (MDRD)   86 L 


 


Glucose   88 


 


Calcium   9.3 


 


Total Bilirubin   0.6 


 


AST   26 


 


ALT   18 


 


Alkaline Phosphatase   78 


 


Total Protein   7.7 


 


Albumin   4.5 


 


Globulin   3.2 


 


Albumin/Globulin Ratio   1.4 


 


Lipase   25 


 


Urine Color    YELLOW


 


Urine Clarity    CLEAR


 


Urine pH    6.0


 


Ur Specific Gravity    1.015


 


Urine Protein    NEGATIVE


 


Urine Glucose (UA)    NEGATIVE


 


Urine Ketones    NEGATIVE


 


Urine Occult Blood    TRACE-INTA


 


Urine Nitrite    NEGATIVE


 


Urine Bilirubin    NEGATIVE


 


Urine Urobilinogen    0.2 (NORMAL)


 


Ur Leukocyte Esterase    NEGATIVE


 


Ur Microscopic Review    NOT INDICATED


 


Urine Culture Comments    NOT INDICATED


 


Urine HCG, Qual    NEGATIVE














- Rads (name of study)


  ** CT Head


Radiology: EMP read contemporaneously (Mild acute left posterior ethmoid 

sinusitis without acute intracranial abnormality)





PD MEDICAL DECISION MAKING





- ED course


ED course: 





This is a 22-year-old woman who presents with acute right lower extremity 

paralysis with sensory deficits and what seems like a spinal level right around 

T11 or so.  Would not be Guillan- Moya because she has brisk reflexes.  

Transverse myelitis or similar pathology is considered most likely.  

Psychogenic component is also considered.  She sent for an urgent CT of the 

head but will likely need transfer to a facility capable of urgent MRI and 

neurologic consult and she was accepted at [facility] by Cholo Huerta at 8:08 

PM and cobras were completed.








- Sepsis Event


Vital Signs: 


 Vital Signs - 24 hr











  07/21/18 07/21/18 07/21/18





  19:23 20:27 21:03


 


Temperature 37.1 C  


 


Heart Rate 77 76 75


 


Respiratory 20 18 16





Rate   


 


Blood Pressure 111/42 L 100/50 L 118/68


 


O2 Saturation 100 100 100








 Oxygen











O2 Source                      Room air

















Departure





- Departure


Disposition: 02 [facility]

## 2018-07-21 NOTE — CT REPORT
Procedure Date:  07/21/2018   

Accession Number:  [id number] / [id number]                    

Procedure:  CT  - Head W/O CPT Code:  

 

FULL RESULT:

 

 

EXAM:

CT HEAD

 

EXAM DATE: 7/21/2018 08:15 PM.

 

CLINICAL HISTORY: Right-side deficits

 

COMPARISON: None.

 

TECHNIQUE: Multiaxial CT images were obtained from the foramen magnum to 

the vertex. Reformats: Sagittal and coronal. IV contrast: None.

 

In accordance with CT protocol optimization, one or more of the following 

dose reduction techniques were utilized for this exam: automated exposure 

control, adjustment of mA and/or KV based on patient size, or use of 

iterative reconstructive technique.

 

FINDINGS:

Parenchyma: No intraparenchymal hemorrhage, mass effect, or CT findings 

of evolving acute/subacute infarct. Gray-white differentiation is 

distinct.

 

Extraaxial Spaces: Normal for age. No subdural or epidural collections 

identified.

 

Ventricles: The ventricles and basal cisterns are patent. No 

hydrocephalus or midline shift.

 

Sinuses and Orbits: Trace fluid with air bubbles in the left posterior 

ethmoid air cells. The visualized paranasal sinuses and mastoid air cells 

are otherwise clear.

 

Bones: No evidence of fracture or calvarial defect.

 

Other: None.

IMPRESSION:

1. Mild acute left posterior ethmoid sinusitis.

2. No acute intracranial abnormalities.

 

[facility]

## 2018-09-20 ENCOUNTER — HOSPITAL ENCOUNTER (EMERGENCY)
Dept: HOSPITAL 76 - ED | Age: 23
Discharge: HOME | End: 2018-09-20
Payer: COMMERCIAL

## 2018-09-20 VITALS — SYSTOLIC BLOOD PRESSURE: 124 MMHG | DIASTOLIC BLOOD PRESSURE: 62 MMHG

## 2018-09-20 DIAGNOSIS — B96.89: ICD-10-CM

## 2018-09-20 DIAGNOSIS — N76.0: Primary | ICD-10-CM

## 2018-09-20 LAB
CLARITY UR REFRACT.AUTO: CLEAR
GLUCOSE UR QL STRIP.AUTO: NEGATIVE MG/DL
HCG UR QL: NEGATIVE
KETONES UR QL STRIP.AUTO: (no result) MG/DL
NITRITE UR QL STRIP.AUTO: NEGATIVE
PH UR STRIP.AUTO: 6.5 PH (ref 5–7.5)
PROT UR STRIP.AUTO-MCNC: NEGATIVE MG/DL
RBC # UR STRIP.AUTO: (no result) /UL
SP GR UR STRIP.AUTO: 1.02 (ref 1–1.03)
UROBILINOGEN UR QL STRIP.AUTO: (no result) E.U./DL
UROBILINOGEN UR STRIP.AUTO-MCNC: NEGATIVE MG/DL

## 2018-09-20 PROCEDURE — 99283 EMERGENCY DEPT VISIT LOW MDM: CPT

## 2018-09-20 PROCEDURE — 81003 URINALYSIS AUTO W/O SCOPE: CPT

## 2018-09-20 PROCEDURE — 81001 URINALYSIS AUTO W/SCOPE: CPT

## 2018-09-20 PROCEDURE — 87086 URINE CULTURE/COLONY COUNT: CPT

## 2018-09-20 PROCEDURE — 81025 URINE PREGNANCY TEST: CPT

## 2018-09-20 NOTE — ED PHYSICIAN DOCUMENTATION
PD HPI FEMALE 





- Stated complaint


Stated Complaint: FEMALE 





- Chief complaint


Chief Complaint: General





- History obtained from


History obtained from: Patient





- History of Present Illness


Timing - onset: How many days ago (3)


Timing - duration: Days (3)


Timing - details: Gradual onset, Still present


Associated symptoms: Vaginal discharge


OB-GYN History: G (0), P (0)


Similar symptoms before: Diagnosis (BV)


Recently seen: Not recently seen





- Additional information


Additional information: 





22-year-old sexually active female who has developed symptoms again of bacterial

vaginosis.  She states that she is also recently broken up with her boyfriend.  

She has had this happen to her twice previously and she is certain of her 

symptoms.  She does state that she was transferred to [facility] in July and was 

diagnosed with a conversion disorder.  She did require gait retraining.





Review of Systems


Constitutional: denies: Fever


Eyes: denies: Decreased vision


Ears: denies: Ear pain


Nose: denies: Congestion


Throat: denies: Sore throat


Cardiac: denies: Chest pain / pressure


Respiratory: denies: Dyspnea, Cough


GI: denies: Abdominal Swelling, Nausea, Vomiting


: reports: Discharge.  denies: Dysuria, Frequency


Skin: denies: Rash


Musculoskeletal: denies: Neck pain, Back pain, Extremity pain





PD PAST MEDICAL HISTORY





- Past Medical History


: Other





- Past Surgical History


Past Surgical History: No





- Present Medications


Home Medications: 


                                Ambulatory Orders











 Medication  Instructions  Recorded  Confirmed


 


Metronidazole [Flagyl] 500 mg PO BID #14 tablet 06/17/18 


 


Metronidazole 500 mg PO BID #14 tablet 07/11/18 


 


Clindamycin Phosphate [Clindesse] 5 gm VG DAILY #7 crm.er..g. 07/12/18 


 


Metronidazole [Flagyl] 500 mg PO BID #14 tablet 09/20/18 














- Allergies


Allergies/Adverse Reactions: 


                                    Allergies











Allergy/AdvReac Type Severity Reaction Status Date / Time


 


No Known Drug Allergies Allergy   Verified 07/12/18 14:37














- Social History


Does the pt smoke?: No


Smoking Status: Never smoker


Does the pt drink ETOH?: No


Does the pt have substance abuse?: No





- Immunizations


Immunizations are current?: Yes





- POLST


Patient has POLST: No





PD ED PE NORMAL





- Vitals


Vital signs reviewed: Yes (normal )





- General


General: Alert and oriented X 3, No acute distress, Well developed/nourished





- HEENT


HEENT: Atraumatic, PERRL, EOMI





- Respiratory


Respiratory: No respiratory distress





- Derm


Derm: Normal color, Warm and dry, No rash





- Extremities


Extremities: No deformity, No edema





- Neuro


Neuro: Alert and oriented X 3, CNs 2-12 intact, No motor deficit, No sensory 

deficit, Normal speech


Eye Opening: Spontaneous


Motor: Obeys Commands


Verbal: Oriented


GCS Score: 15





- Psych


Psych: Normal mood, Normal affect





Results





- Vitals


Vitals: 


                               Vital Signs - 24 hr











  09/20/18





  10:28


 


Temperature 36.5 C


 


Heart Rate 74


 


Respiratory 16





Rate 


 


Blood Pressure 127/63


 


O2 Saturation 99








                                     Oxygen











O2 Source                      Room air

















- Labs


Labs: 


                                Laboratory Tests











  09/20/18





  10:50


 


Urine Color  YELLOW


 


Urine Clarity  CLEAR


 


Urine pH  6.5


 


Ur Specific Gravity  1.025


 


Urine Protein  NEGATIVE


 


Urine Glucose (UA)  NEGATIVE


 


Urine Ketones  TRACE


 


Urine Occult Blood  TRACE-LYSE


 


Urine Nitrite  NEGATIVE


 


Urine Bilirubin  NEGATIVE


 


Urine Urobilinogen  0.2 (NORMAL)


 


Ur Leukocyte Esterase  NEGATIVE


 


Ur Microscopic Review  NOT INDICATED


 


Urine Culture Comments  NOT INDICATED


 


Urine HCG, Qual  NEGATIVE














PD MEDICAL DECISION MAKING





- ED course


Complexity details: considered differential, d/w patient


ED course: 





22-year-old female with typical symptoms of bacterial vaginosis with a foul-

smelling discharge similar to what she has had previously when this is been 

diagnosed recalls that she has had good luck with the use of oral metronidazole.

  She prefers pills versus cream.





I did review her record and it does appear that she had a question of some 

reaction to the Flagyl when it was prescribed last time and clindamycin was 

substituted.  Despite this she is still had the conversion reaction about 1 week

 later.  I believe the patient will be okay with the Flagyl.





- Sepsis Event


Vital Signs: 


                               Vital Signs - 24 hr











  09/20/18





  10:28


 


Temperature 36.5 C


 


Heart Rate 74


 


Respiratory 16





Rate 


 


Blood Pressure 127/63


 


O2 Saturation 99








                                     Oxygen











O2 Source                      Room air

















Departure





- Departure


Disposition: 01 Home, Self Care


Clinical Impression: 


 Bacterial vaginitis





Condition: Stable


Instructions:  ED Vaginosis Bacterial


Follow-Up: 


VICENTE BOSS [Primary Care Provider] - 


Prescriptions: 


Metronidazole [Flagyl] 500 mg PO BID #14 tablet

## 2019-01-14 ENCOUNTER — HOSPITAL ENCOUNTER (EMERGENCY)
Dept: HOSPITAL 76 - ED | Age: 24
Discharge: HOME | End: 2019-01-14
Payer: COMMERCIAL

## 2019-01-14 VITALS — SYSTOLIC BLOOD PRESSURE: 108 MMHG | DIASTOLIC BLOOD PRESSURE: 56 MMHG

## 2019-01-14 DIAGNOSIS — F44.6: Primary | ICD-10-CM

## 2019-01-14 PROCEDURE — 99283 EMERGENCY DEPT VISIT LOW MDM: CPT

## 2019-01-14 PROCEDURE — 99282 EMERGENCY DEPT VISIT SF MDM: CPT

## 2019-01-14 NOTE — ED PHYSICIAN DOCUMENTATION
History of Present Illness





- Stated complaint


Stated Complaint: EXTREMITY NUMBNESS





- Chief complaint


Chief Complaint: MHE





- Additonal information


Additional information: 





23 f


AD [place]


to ED with right sided jnumbness and weakness now resolved


states she has a long hx of similar sx - sometimes waist down sometimes one 

sided


intially she was transferred to [facility] and per her report had a very extensive 

neuro work up and was told her sx were not due to a primary neuro process and 

was dx with cencersion disorder


she has right sided numbness and weakness off and on again all weeekend with 

palpitations


so came to ED today


now sx are resolved and she want to go home


no HA


no fever





Review of Systems


Constitutional: denies: Fever, Chills


Cardiac: denies: Chest pain / pressure


Respiratory: denies: Dyspnea


GI: denies: Abdominal Pain


Neurologic: reports: Focal weakness, Numbness.  denies: Headache





PD PAST MEDICAL HISTORY





- Past Medical History


: Other





- Past Surgical History


Past Surgical History: No





- Present Medications


Home Medications: 


                                Ambulatory Orders











 Medication  Instructions  Recorded  Confirmed


 


Metronidazole [Flagyl] 500 mg PO BID #14 tablet 06/17/18 


 


Metronidazole 500 mg PO BID #14 tablet 07/11/18 


 


Clindamycin Phosphate [Clindesse] 5 gm VG DAILY #7 crm.er..g. 07/12/18 


 


Metronidazole [Flagyl] 500 mg PO BID #14 tablet 09/20/18 














- Allergies


Allergies/Adverse Reactions: 


                                    Allergies











Allergy/AdvReac Type Severity Reaction Status Date / Time


 


No Known Drug Allergies Allergy   Verified 07/12/18 14:37














- Social History


Does the pt smoke?: No


Smoking Status: Never smoker


Does the pt drink ETOH?: No


Does the pt have substance abuse?: No





- Immunizations


Immunizations are current?: Yes





- POLST


Patient has POLST: No





PD ED PE NORMAL





- Vitals


Vital signs reviewed: Yes





- General


General: Alert and oriented X 3





- HEENT


HEENT: PERRL





- Neck


Neck: Supple, no meningeal sign





- Cardiac


Cardiac: RRR





- Respiratory


Respiratory: No respiratory distress, Clear bilaterally





- Neuro


Neuro: Alert and oriented X 3, CNs 2-12 intact, No motor deficit, No sensory 

deficit, Normal speech


Eye Opening: Spontaneous


Motor: Obeys Commands


Verbal: Oriented


GCS Score: 15





Results





- Vitals


Vitals: 





                               Vital Signs - 24 hr











  01/14/19 01/14/19





  12:01 12:30


 


Temperature 36.8 C 


 


Heart Rate 80 80


 


Respiratory 16 16





Rate  


 


Blood Pressure 116/61 118/66


 


O2 Saturation 100 100








                                     Oxygen











O2 Source                      Room air

















- Tele (time rhythm occurred)


  ** 1400


Telemetry / rhythm strip: NSR





Departure





- Departure


Disposition: 01 Home, Self Care


Clinical Impression: 


 Conversion disorder, Paresthesia





Condition: Good


Follow-Up: 


[facility] [place] [Provider Group]

## 2019-09-10 ENCOUNTER — HOSPITAL ENCOUNTER (EMERGENCY)
Dept: HOSPITAL 76 - ED | Age: 24
Discharge: HOME | End: 2019-09-10
Payer: COMMERCIAL

## 2019-09-10 VITALS — SYSTOLIC BLOOD PRESSURE: 106 MMHG | DIASTOLIC BLOOD PRESSURE: 69 MMHG

## 2019-09-10 DIAGNOSIS — R53.83: ICD-10-CM

## 2019-09-10 DIAGNOSIS — R06.01: ICD-10-CM

## 2019-09-10 DIAGNOSIS — R06.02: ICD-10-CM

## 2019-09-10 DIAGNOSIS — G43.409: Primary | ICD-10-CM

## 2019-09-10 DIAGNOSIS — R00.2: ICD-10-CM

## 2019-09-10 LAB
ALBUMIN DIAFP-MCNC: 4 G/DL (ref 3.2–5.5)
ALBUMIN/GLOB SERPL: 1.2 {RATIO} (ref 1–2.2)
ALP SERPL-CCNC: 48 IU/L (ref 42–121)
ALT SERPL W P-5'-P-CCNC: 13 IU/L (ref 10–60)
ANION GAP SERPL CALCULATED.4IONS-SCNC: 8 MMOL/L (ref 6–13)
AST SERPL W P-5'-P-CCNC: 18 IU/L (ref 10–42)
BASOPHILS NFR BLD AUTO: 0 10^3/UL (ref 0–0.1)
BASOPHILS NFR BLD AUTO: 0.5 %
BILIRUB BLD-MCNC: 0.7 MG/DL (ref 0.2–1)
BUN SERPL-MCNC: 11 MG/DL (ref 6–20)
CALCIUM UR-MCNC: 9.3 MG/DL (ref 8.5–10.3)
CHLORIDE SERPL-SCNC: 106 MMOL/L (ref 101–111)
CO2 SERPL-SCNC: 24 MMOL/L (ref 21–32)
CREAT SERPLBLD-SCNC: 0.8 MG/DL (ref 0.4–1)
EOSINOPHIL # BLD AUTO: 0 10^3/UL (ref 0–0.7)
EOSINOPHIL NFR BLD AUTO: 1.1 %
ERYTHROCYTE [DISTWIDTH] IN BLOOD BY AUTOMATED COUNT: 11.8 % (ref 12–15)
GFRSERPLBLD MDRD-ARVRAT: 110 ML/MIN/{1.73_M2} (ref 89–?)
GLOBULIN SER-MCNC: 3.3 G/DL (ref 2.1–4.2)
GLUCOSE SERPL-MCNC: 95 MG/DL (ref 70–100)
HGB UR QL STRIP: 12.3 G/DL (ref 12–16)
LIPASE SERPL-CCNC: 28 U/L (ref 22–51)
LYMPHOCYTES # SPEC AUTO: 1.9 10^3/UL (ref 1.5–3.5)
LYMPHOCYTES NFR BLD AUTO: 51.1 %
MCH RBC QN AUTO: 29.1 PG (ref 27–31)
MCHC RBC AUTO-ENTMCNC: 33.1 G/DL (ref 32–36)
MCV RBC AUTO: 87.9 FL (ref 81–99)
MONOCYTES # BLD AUTO: 0.3 10^3/UL (ref 0–1)
MONOCYTES NFR BLD AUTO: 6.9 %
NEUTROPHILS # BLD AUTO: 1.5 10^3/UL (ref 1.5–6.6)
NEUTROPHILS # SNV AUTO: 3.8 X10^3/UL (ref 4.8–10.8)
NEUTROPHILS NFR BLD AUTO: 40.1 %
PDW BLD AUTO: 9.9 FL (ref 7.9–10.8)
PLATELET # BLD: 176 10^3/UL (ref 130–450)
PROT SPEC-MCNC: 7.3 G/DL (ref 6.7–8.2)
RBC MAR: 4.23 10^6/UL (ref 4.2–5.4)
SODIUM SERPLBLD-SCNC: 138 MMOL/L (ref 135–145)

## 2019-09-10 PROCEDURE — 85651 RBC SED RATE NONAUTOMATED: CPT

## 2019-09-10 PROCEDURE — 80053 COMPREHEN METABOLIC PANEL: CPT

## 2019-09-10 PROCEDURE — 70450 CT HEAD/BRAIN W/O DYE: CPT

## 2019-09-10 PROCEDURE — 99284 EMERGENCY DEPT VISIT MOD MDM: CPT

## 2019-09-10 PROCEDURE — 36415 COLL VENOUS BLD VENIPUNCTURE: CPT

## 2019-09-10 PROCEDURE — 83690 ASSAY OF LIPASE: CPT

## 2019-09-10 PROCEDURE — 84443 ASSAY THYROID STIM HORMONE: CPT

## 2019-09-10 PROCEDURE — 85025 COMPLETE CBC W/AUTO DIFF WBC: CPT

## 2019-09-10 PROCEDURE — 93005 ELECTROCARDIOGRAM TRACING: CPT

## 2019-09-10 NOTE — CT REPORT
Reason:  headaches for 1-2 weeks

Procedure Date:  09/10/2019   

Accession Number:  [id number] / [id number]                    

Procedure:  CT  - HEAD WO CPT Code:  

 

FULL RESULT:

 

 

EXAM:

CT HEAD

 

EXAM DATE: 9/10/2019 06:44 AM.

 

CLINICAL HISTORY: Headaches for 1-2 weeks.

 

COMPARISON: HEAD W/O 07/21/2018 8:10 PM.

 

TECHNIQUE: Multiaxial CT images were obtained from the foramen magnum to 

the vertex. Reformats: Sagittal and coronal. IV contrast: None.

 

In accordance with CT protocol optimization, one or more of the following 

dose reduction techniques were utilized for this exam: automated exposure 

control, adjustment of mA and/or KV based on patient size, or use of 

iterative reconstructive technique.

 

FINDINGS:

Parenchyma: No intraparenchymal hemorrhage. No evidence of mass, midline 

shift, or CT findings of infarction. Gray-white differentiation is 

distinct.

 

Extraaxial Spaces: Normal for age. No subdural or epidural collections 

identified.

 

Ventricles: Normal in size and position.

 

Sinuses and Orbits: Imaged paranasal sinuses, orbits, and mastoids show 

no significant abnormality.

 

Bones: No evidence of fracture or calvarial defect.

 

Other: None.

IMPRESSION: Normal head CT.

 

[facility]

## 2019-09-10 NOTE — ED PHYSICIAN DOCUMENTATION
PD HPI HEADACHE





- Stated complaint


Stated Complaint: SOA,DIZZY, HEAD PX





- Chief complaint


Chief Complaint: Neuro





- History obtained from


History obtained from: Patient





- History of Present Illness


Timing - onset: Today


Timing - onset during: Rest


Timing - details: Gradual onset


Worst headache ever?: No: Worst headache ever?


Location: [facility]


Quality: Throbbing, Aching


Associated symptoms: Nausea, Numbness (she felt some trembling/spasm of right 

thumb. She has had muscle spasming like that or even paralysis of right side 

with or preceding headaches in the past. Had been seen in [facility] once for these

and Dx with Conversion disorder (not sure if it complex migraine or hemiplegic 

migraine was considered).).  No: Fever, Stiff neck, Vomiting, Vision changes


Worsened by: Light


Contributing factors: No: Anticoagulated, Recent illness, Trauma


Similar symptoms before: Diagnosis (Dx with migraines and also with conversion 

disorder at times. Has not had the dyspnea symptoms with it previously.)


Recently seen: Clinic (She was seen by her primary care last week and given 

prescription for ibuprofen and also started on citalopram at half dose daily and

will start a full tablet starting tomorrow.  She states she has been feeling 

okay since starting the citalopram though this morning awoke with a feeling of 

shortness of breath palpitations and then noticed her right hand and thumb 

trembling.  She has had a trembling type symptom at times with migraines before 

but had not had the dyspnea and feeling of palpitations previously.  She has had

daily or chronic headaches for months interposed with more severe headaches 

every few days.  She states she has been seen by her primary care regarding 

them.  She had been transferred to [facility] once recently and evaluated there for

a couple of days but she states she has not had any ongoing neurology 

evaluation.  She is not sure if she has had neuro imaging as she got anxious 

when she was getting an MRI at [facility] and she says this study was not 

completed.)





Review of Systems


Constitutional: reports: Fatigue (for couple of weeks).  denies: Fever, Chills


Nose: denies: Rhinorrhea / runny nose, Congestion


Throat: denies: Sore throat


Cardiac: reports: Palpitations (this morning)


Respiratory: reports: Dyspnea (this morning).  denies: Cough


GI: reports: Nausea.  denies: Abdominal Pain, Vomiting, Diarrhea


Neurologic: reports: Headache.  denies: Focal weakness, Numbness, Confused, 

Altered mental status, Head injury





PD PAST MEDICAL HISTORY





- Past Medical History


Past Medical History: Yes


Neuro: Migraines, Other


: Other


Other Past Medical History: conversion disorder





- Past Surgical History


Past Surgical History: No





- Present Medications


Home Medications: 


                                Ambulatory Orders











 Medication  Instructions  Recorded  Confirmed


 


Citalopram [CeleXA] 10 mg PO DAILY 09/10/19 09/10/19


 


Ibuprofen [Motrin] 1 tab PO DAILY 09/10/19 09/10/19


 


Nortriptyline HCl 25 mg PO QPM #30 capsule 09/10/19 


 


Ondansetron Odt [Zofran] 4 mg TL Q6H PRN #10 tablet 09/10/19 


 


SUMAtriptan succinate [Sumatriptan 50 mg PO ONCE PRN #5 tablet 09/10/19 





Succinate]   


 


dexAMETHasone [Decadron] 4 mg PO DAILY #5 tablet 09/10/19 














- Allergies


Allergies/Adverse Reactions: 


                                    Allergies











Allergy/AdvReac Type Severity Reaction Status Date / Time


 


No Known Drug Allergies Allergy   Verified 09/10/19 05:25














- Social History


Does the pt smoke?: No


Smoking Status: Never smoker


Does the pt drink ETOH?: Yes


Does the pt have substance abuse?: No





- Immunizations


Immunizations are current?: Yes





- POLST


Patient has POLST: No





PD ED PE NORMAL





- Vitals


Vital signs reviewed: Yes





- General


General: Alert and oriented X 3, No acute distress, Well developed/nourished





- HEENT


HEENT: PERRL, EOMI (some light sensitivity), Pharynx benign





- Neck


Neck: Supple, no meningeal sign, No adenopathy





- Cardiac


Cardiac: RRR, No murmur





- Respiratory


Respiratory: Clear bilaterally





- Abdomen


Abdomen: Soft, Non tender





- Back


Back: No CVA TTP





- Derm


Derm: Normal color, Warm and dry





- Extremities


Extremities: No tenderness to palpate, Normal ROM s pain





- Neuro


Neuro: Alert and oriented X 3, CNs 2-12 intact, No motor deficit, No sensory 

deficit


Eye Opening: Spontaneous


Motor: Obeys Commands


Verbal: Oriented


GCS Score: 15





- Psych


Psych: Normal mood, Normal affect





Results





- Vitals


Vitals: 


                               Vital Signs - 24 hr











  09/10/19 09/10/19





  05:19 07:11


 


Temperature 36.5 C 


 


Heart Rate 62 64


 


Respiratory 16 16





Rate  


 


Blood Pressure  106/65


 


O2 Saturation 100 100








                                     Oxygen











O2 Source                      Room air

















- EKG (time done)


  ** 05:22


Rate: Rate (enter#) (61)


Rhythm: NSR


Axis: Normal


Intervals: Normal NM


QRS: Normal


Ischemia: Normal ST segments.  No: ST elevation c/w ischemia, ST depression


Compare to prior EKG: Old EKG unavailable





- Labs


Labs: 


                                Laboratory Tests











  09/10/19 09/10/19 09/10/19





  05:33 05:33 05:33


 


WBC  3.8 L  


 


RBC  4.23  


 


Hgb  12.3  


 


Hct  37.2  


 


MCV  87.9  


 


MCH  29.1  


 


MCHC  33.1  


 


RDW  11.8 L  


 


Plt Count  176  


 


MPV  9.9  


 


Neut # (Auto)  1.5  


 


Lymph # (Auto)  1.9  


 


Mono # (Auto)  0.3  


 


Eos # (Auto)  0.0  


 


Baso # (Auto)  0.0  


 


Absolute Nucleated RBC  0.00  


 


Nucleated RBC %  0.0  


 


ESR    7


 


Sodium   138 


 


Potassium   3.6 


 


Chloride   106 


 


Carbon Dioxide   24 


 


Anion Gap   8.0 


 


BUN   11 


 


Creatinine   0.8 


 


Estimated GFR (MDRD)   110 


 


Glucose   95 


 


POC Whole Bld Glucose   


 


Calcium   9.3 


 


Total Bilirubin   0.7 


 


AST   18 


 


ALT   13 


 


Alkaline Phosphatase   48 


 


Total Protein   7.3 


 


Albumin   4.0 


 


Globulin   3.3 


 


Albumin/Globulin Ratio   1.2 


 


Lipase   28 














  09/10/19





  06:49


 


WBC 


 


RBC 


 


Hgb 


 


Hct 


 


MCV 


 


MCH 


 


MCHC 


 


RDW 


 


Plt Count 


 


MPV 


 


Neut # (Auto) 


 


Lymph # (Auto) 


 


Mono # (Auto) 


 


Eos # (Auto) 


 


Baso # (Auto) 


 


Absolute Nucleated RBC 


 


Nucleated RBC % 


 


ESR 


 


Sodium 


 


Potassium 


 


Chloride 


 


Carbon Dioxide 


 


Anion Gap 


 


BUN 


 


Creatinine 


 


Estimated GFR (MDRD) 


 


Glucose 


 


POC Whole Bld Glucose  82


 


Calcium 


 


Total Bilirubin 


 


AST 


 


ALT 


 


Alkaline Phosphatase 


 


Total Protein 


 


Albumin 


 


Globulin 


 


Albumin/Globulin Ratio 


 


Lipase 














- Rads (name of study)


  ** head CT


Radiology: Prelim report reviewed (No acute process.), See rad report





PD MEDICAL DECISION MAKING





- ED course


Complexity details: reviewed results, considered differential (The current 

dyspnea and palpitations are of unclear cause.  She has good EKG and blood pre

ssure and oxygen saturations.  She feels better here.  Consider possible anxiety

 attack or hyperventilation.  She has not had any cough or cold symptoms.  Her 

lungs are clear she does not sound like pneumonia or asthma.  Regarding the 

ongoing headaches, we can get a CT scan as she does not believe she has had any 

prior neuro imaging.  Also can check sed rate and blood count.  She also 

complains of general fatigue so we will check electrolytes blood count and 

thyroid.  Consider her symptoms this morning is potential side effects to the 

citalopram she just started last week.  She can continue it for another couple 

of days and see if she has further symptoms.  If so to could discontinue it.  

Otherwise could consider changing to something like nortriptyline which would 

get the antianxiety effect as well as help with sleep would also be useful for 

chronic daily headaches.  She should consider discussing this with her primary 

care.  Also to consider having her primary care refer her for a neurology 

consult.), d/w patient





Departure





- Departure


Disposition: 01 Home, Self Care


Clinical Impression: 


Migraine headache


Qualifiers:


 Migraine type: hemiplegic Status migrainosus presence: without status 

migrainosus Intractability: not intractable Qualified Code(s): G43.409 - 

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





Dyspnea


Qualifiers:


 Dyspnea type: shortness of breath Qualified Code(s): R06.02 - Shortness of 

breath; R06.00 - Dyspnea, unspecified; R06.01 - Orthopnea





Fatigue


Qualifiers:


 Fatigue type: unspecified Qualified Code(s): R53.83 - Other fatigue





Condition: Stable


Record reviewed to determine appropriate education?: Yes


Instructions:  ED Dyspnea Shortness of Breath, ED Headache Migraine


Follow-Up: 


VICENTE BOSS [Primary Care Provider] - 


Prescriptions: 


dexAMETHasone [Decadron] 4 mg PO DAILY #5 tablet


Nortriptyline HCl 25 mg PO QPM #30 capsule


Ondansetron Odt [Zofran] 4 mg TL Q6H PRN #10 tablet


 PRN Reason: Nausea / Vomiting


SUMAtriptan succinate [Sumatriptan Succinate] 50 mg PO ONCE PRN #5 tablet


 PRN Reason: Migraine


Comments: 


Stay well-hydrated.  Your CT scan appears normal.  Your blood tests are also 

good right now without any obvious cause for fatigue and tiredness.





For your headaches, I would suggest using Decadron steroid anti-inflammatory 

daily for 5 days and see if that will improve it overall.  For subsequent 

migraine type headaches he can use ondansetron for nausea combined with the 

ibuprofen and also try sumatriptan tablet and see if it stops the headache.





For daily headaches, use the ibuprofen.  You can continue the citalopram 

recently prescribed or alternatively could consider changing to nortriptyline 

which is used not only for anxiety but also is used but commonly for daily 

headaches and can help with sleep as well.  You could discuss this with your 

primary care.

## 2019-12-23 ENCOUNTER — HOSPITAL ENCOUNTER (EMERGENCY)
Dept: HOSPITAL 76 - ED | Age: 24
Discharge: HOME | End: 2019-12-23
Payer: COMMERCIAL

## 2019-12-23 VITALS — SYSTOLIC BLOOD PRESSURE: 128 MMHG | DIASTOLIC BLOOD PRESSURE: 72 MMHG

## 2019-12-23 DIAGNOSIS — Z98.818: ICD-10-CM

## 2019-12-23 DIAGNOSIS — G43.909: Primary | ICD-10-CM

## 2019-12-23 PROCEDURE — 96374 THER/PROPH/DIAG INJ IV PUSH: CPT

## 2019-12-23 PROCEDURE — 99284 EMERGENCY DEPT VISIT MOD MDM: CPT

## 2019-12-23 PROCEDURE — 99283 EMERGENCY DEPT VISIT LOW MDM: CPT

## 2019-12-23 PROCEDURE — 96375 TX/PRO/DX INJ NEW DRUG ADDON: CPT

## 2019-12-23 RX ADMIN — KETOROLAC TROMETHAMINE STA MG: 30 INJECTION, SOLUTION INTRAMUSCULAR at 15:55

## 2019-12-23 NOTE — ED PHYSICIAN DOCUMENTATION
PD HPI HEADACHE





- Stated complaint


Stated Complaint: HEADACHE X3 DAYS





- Chief complaint


Chief Complaint: Neuro





- History obtained from


History obtained from: Patient





- History of Present Illness


Timing - onset: How many days ago (4)


Timing - onset during: Rest


Timing - duration: Days (3)


Timing - details: Gradual onset


Pain level max: 7


Pain level now: 6


Worst headache ever?: No: Worst headache ever?


Location: [facility]


Quality: Throbbing, Aching


Associated symptoms: No: Fever, Stiff neck, Nausea, Vomiting, Weakness, 

Numbness, Syncope, Seizure, Eye pain, Vision changes


Improved by: Rest, Dark room


Worsened by: Light, Noise





- Additional information


Additional information: 





Patient is 4 days status post wisdom tooth extraction.  She states she has had 

her usual migraine since that time.  Took Imitrex, Motrin and oxycodone without 

relief.  No fevers.  Worse with light and sound.  Better with rest.





Review of Systems


Constitutional: denies: Fever, Chills


Throat: denies: Sore throat


Cardiac: denies: Chest pain / pressure


Respiratory: denies: Cough


GI: denies: Vomiting, Diarrhea


: denies: Now pregnant EGA


Skin: denies: Rash





PD PAST MEDICAL HISTORY





- Past Medical History


Neuro: Migraines, Other


: Other





- Past Surgical History


Past Surgical History: No





- Present Medications


Home Medications: 


                                Ambulatory Orders











 Medication  Instructions  Recorded  Confirmed


 


Citalopram [CeleXA] 10 mg PO DAILY 09/10/19 09/10/19


 


Ibuprofen [Motrin] 1 tab PO DAILY 09/10/19 09/10/19


 


Nortriptyline HCl 25 mg PO QPM #30 capsule 09/10/19 


 


Ondansetron Odt [Zofran] 4 mg TL Q6H PRN #10 tablet 09/10/19 


 


SUMAtriptan succinate [Sumatriptan 50 mg PO ONCE PRN #5 tablet 09/10/19 





Succinate]   


 


dexAMETHasone [Decadron] 4 mg PO DAILY #5 tablet 09/10/19 














- Allergies


Allergies/Adverse Reactions: 


                                    Allergies











Allergy/AdvReac Type Severity Reaction Status Date / Time


 


No Known Drug Allergies Allergy   Verified 12/23/19 11:38














- Social History


Does the pt smoke?: No


Smoking Status: Never smoker


Does the pt drink ETOH?: Yes


Does the pt have substance abuse?: No





- Immunizations


Immunizations are current?: Yes





- POLST


Patient has POLST: No





PD ED PE NORMAL





- Vitals


Vital signs reviewed: Yes





- General


General: Alert and oriented X 3, No acute distress, Well developed/nourished





- HEENT


HEENT: Atraumatic, PERRL, EOMI, Ears normal, Moist mucous membranes, Other (No 

evidence of infection at the extraction sites.  No temporal artery tenderness.)





- Neck


Neck: Supple, no meningeal sign





- Cardiac


Cardiac: RRR, Strong equal pulses





- Respiratory


Respiratory: No respiratory distress, Clear bilaterally





- Abdomen


Abdomen: Soft, Non tender, Non distended





- Derm


Derm: Warm and dry





- Neuro


Neuro: Alert and oriented X 3, CNs 2-12 intact, No motor deficit, No sensory 

deficit, Normal speech


Eye Opening: Spontaneous


Motor: Obeys Commands


Verbal: Oriented


GCS Score: 15





- Psych


Psych: Normal mood, Normal affect





Results





- Vitals


Vitals: 


                               Vital Signs - 24 hr











  12/23/19 12/23/19





  11:38 13:52


 


Temperature 36.7 C 37.3 C


 


Heart Rate 74 85


 


Respiratory 17 18





Rate  


 


Blood Pressure 111/66 118/50 L


 


O2 Saturation 100 98








                                     Oxygen











O2 Source                      Room air

















PD MEDICAL DECISION MAKING





- ED course


Complexity details: re-evaluated patient, considered differential, d/w patient


ED course: 





Headache resolved after Toradol, Compazine, Benadryl and normal saline.  No 

evidence of subarachnoid hemorrhage.  She would like to go home at this time.  

Patient counseled regarding signs and symptoms for which I believe and urgent 

re-evaluation would be necessary. Patient with good understanding of and 

agreement to plan and is comfortable going home at this time





This document was made in part using voice recognition software. While efforts 

are made to proofread this document, sound alike and grammatical errors may 

occur.





No fevers.  No meningitis.  No encephalitis.





Departure





- Departure


Disposition: 01 Home, Self Care


Clinical Impression: 


Migraine


Qualifiers:


 Migraine type: unspecified Status migrainosus presence: without status 

migrainosus Intractability: not intractable Qualified Code(s): G43.909 - 

Migraine, unspecified, not intractable, without status migrainosus





Condition: Good


Instructions:  ED Headache Migraine


Follow-Up: 


VICENTE BOSS [Primary Care Provider] - Within 1 week


Comments: 


Go home and rest.  Drink plenty of fluids.  Return if you worsen.